# Patient Record
Sex: FEMALE | URBAN - METROPOLITAN AREA
[De-identification: names, ages, dates, MRNs, and addresses within clinical notes are randomized per-mention and may not be internally consistent; named-entity substitution may affect disease eponyms.]

---

## 2024-07-01 ENCOUNTER — APPOINTMENT (RX ONLY)
Dept: URBAN - METROPOLITAN AREA CLINIC 147 | Facility: CLINIC | Age: 54
Setting detail: DERMATOLOGY
End: 2024-07-01

## 2024-07-01 DIAGNOSIS — Z71.89 OTHER SPECIFIED COUNSELING: ICD-10-CM

## 2024-07-01 DIAGNOSIS — L63.8 OTHER ALOPECIA AREATA: ICD-10-CM

## 2024-07-01 PROCEDURE — ? SUNSCREEN RECOMMENDATIONS

## 2024-07-01 PROCEDURE — 99203 OFFICE O/P NEW LOW 30 MIN: CPT

## 2024-07-01 PROCEDURE — ? ADDITIONAL NOTES

## 2024-07-01 PROCEDURE — ? COUNSELING

## 2024-07-01 ASSESSMENT — LOCATION ZONE DERM: LOCATION ZONE: FACE

## 2024-07-01 ASSESSMENT — LOCATION DETAILED DESCRIPTION DERM: LOCATION DETAILED: RIGHT INFERIOR LATERAL FOREHEAD

## 2024-07-01 ASSESSMENT — LOCATION SIMPLE DESCRIPTION DERM: LOCATION SIMPLE: RIGHT FOREHEAD

## 2024-07-01 NOTE — HPI: HAIR LOSS
Previous Labs: No
How Did The Hair Loss Occur?: gradual in onset
How Severe Is Your Hair Loss?: moderate
What Hair Products Do You Use?: Biolog and [place] ever strong

## 2024-07-01 NOTE — PROCEDURE: ADDITIONAL NOTES
Detail Level: Simple
Additional Notes: Current spots has hair growing out. Recommended patient to come back if new bald spots occurred for possible [place] inj. She has some topical steroids at home and will use as needed for new spots.
Render Risk Assessment In Note?: no